# Patient Record
Sex: FEMALE | Race: WHITE | URBAN - METROPOLITAN AREA
[De-identification: names, ages, dates, MRNs, and addresses within clinical notes are randomized per-mention and may not be internally consistent; named-entity substitution may affect disease eponyms.]

---

## 2017-03-06 ENCOUNTER — IMPORTED ENCOUNTER (OUTPATIENT)
Dept: URBAN - METROPOLITAN AREA CLINIC 50 | Facility: CLINIC | Age: 72
End: 2017-03-06

## 2017-07-17 ENCOUNTER — IMPORTED ENCOUNTER (OUTPATIENT)
Dept: URBAN - METROPOLITAN AREA CLINIC 50 | Facility: CLINIC | Age: 72
End: 2017-07-17

## 2017-09-18 ENCOUNTER — IMPORTED ENCOUNTER (OUTPATIENT)
Dept: URBAN - METROPOLITAN AREA CLINIC 50 | Facility: CLINIC | Age: 72
End: 2017-09-18

## 2018-10-08 ENCOUNTER — IMPORTED ENCOUNTER (OUTPATIENT)
Dept: URBAN - METROPOLITAN AREA CLINIC 50 | Facility: CLINIC | Age: 73
End: 2018-10-08

## 2018-11-14 ENCOUNTER — IMPORTED ENCOUNTER (OUTPATIENT)
Dept: URBAN - METROPOLITAN AREA CLINIC 50 | Facility: CLINIC | Age: 73
End: 2018-11-14

## 2019-08-05 ENCOUNTER — IMPORTED ENCOUNTER (OUTPATIENT)
Dept: URBAN - METROPOLITAN AREA CLINIC 50 | Facility: CLINIC | Age: 74
End: 2019-08-05

## 2019-11-19 ENCOUNTER — IMPORTED ENCOUNTER (OUTPATIENT)
Dept: URBAN - METROPOLITAN AREA CLINIC 50 | Facility: CLINIC | Age: 74
End: 2019-11-19

## 2020-11-23 ENCOUNTER — IMPORTED ENCOUNTER (OUTPATIENT)
Dept: URBAN - METROPOLITAN AREA CLINIC 50 | Facility: CLINIC | Age: 75
End: 2020-11-23

## 2021-03-11 ENCOUNTER — IMPORTED ENCOUNTER (OUTPATIENT)
Dept: URBAN - METROPOLITAN AREA CLINIC 50 | Facility: CLINIC | Age: 76
End: 2021-03-11

## 2021-04-17 ASSESSMENT — VISUAL ACUITY
OD_CC: J1+
OS_CC: J1+
OD_SC: 20/40
OD_PH: 20/25
OD_OTHER: 20/30. 20/40.
OS_CC: J1+
OD_PH: 20/30
OS_CC: J1@ 16 IN
OD_SC: 20/40
OD_CC: J1@ 16 IN
OD_CC: 20/20
OD_CC: 20/20-1
OD_BAT: 20/25+
OD_OTHER: 20/60. 20/200.
OD_BAT: 20/30
OD_CC: 20/25
OD_BAT: 20/60
OD_SC: 20/25+
OD_CC: J1+
OD_SC: 20/30-1

## 2021-04-17 ASSESSMENT — TONOMETRY
OS_IOP_MMHG: 19
OD_IOP_MMHG: 14
OS_IOP_MMHG: 19
OD_IOP_MMHG: 14
OD_IOP_MMHG: 14
OD_IOP_MMHG: 16
OD_IOP_MMHG: 13
OS_IOP_MMHG: 20
OS_IOP_MMHG: 17
OD_IOP_MMHG: 14
OS_IOP_MMHG: 19
OS_IOP_MMHG: 17
OS_IOP_MMHG: 17
OD_IOP_MMHG: 12

## 2021-07-09 NOTE — PROCEDURE NOTE: CLINICAL
PROCEDURE NOTE: Epilation Bilateral Lower Lids. Diagnosis: Dry Eye Syndrome. Consent was obtained prior to the procedure. Patient was brought to slit lamp where trichiasis was/were removed using micro forceps. Patient tolerated procedure well. Alena Pollen

## 2021-07-09 NOTE — PATIENT DISCUSSION
Dry eyes causing corneal neovascularization. Patient not currently using any AT's. Will start aggressive lubrication and steroid drop therapy. Advised to start PF AT's 6 times daily. Start Gel AT's Qhs. Start FML BID. Will hold MRX today and repeat once dryness is controlled.

## 2021-07-30 NOTE — PATIENT DISCUSSION
Dry eyes causing corneal neovascularization is better. Recommend Patient continue using any AT's. Continue Gel AT's Qhs. Continue FML BID.

## 2021-10-15 NOTE — PATIENT DISCUSSION
iop in range today,  hvf from 9/13/2021 reviewed with patient and spouse today.  recommend patient continue latanoprost at bedtime in both eyes.

## 2021-11-22 ENCOUNTER — ANNUAL COMPREHENSIVE EXAM (OUTPATIENT)
Dept: URBAN - METROPOLITAN AREA CLINIC 50 | Facility: CLINIC | Age: 76
End: 2021-11-22

## 2021-11-22 ENCOUNTER — PREPPED CHART (OUTPATIENT)
Dept: URBAN - METROPOLITAN AREA CLINIC 50 | Facility: CLINIC | Age: 76
End: 2021-11-22

## 2021-11-22 DIAGNOSIS — H35.371: ICD-10-CM

## 2021-11-22 DIAGNOSIS — H43.811: ICD-10-CM

## 2021-11-22 PROCEDURE — 92014 COMPRE OPH EXAM EST PT 1/>: CPT

## 2021-11-22 PROCEDURE — 92134 CPTRZ OPH DX IMG PST SGM RTA: CPT

## 2021-11-22 ASSESSMENT — KERATOMETRY
OD_K1POWER_DIOPTERS: 46.50
OS_K2POWER_DIOPTERS: 50.00
OS_AXISANGLE2_DEGREES: 52
OS_K1POWER_DIOPTERS: 46.25
OS_AXISANGLE_DEGREES: 142
OD_AXISANGLE2_DEGREES: 160
OD_K2POWER_DIOPTERS: 47.75
OD_AXISANGLE_DEGREES: 070

## 2021-11-22 ASSESSMENT — VISUAL ACUITY
OU_CC: J1+@18IN
OD_CC: 20/25+1
OU_SC: J2@18IN

## 2021-11-22 ASSESSMENT — TONOMETRY
OS_IOP_MMHG: 17
OD_IOP_MMHG: 16

## 2021-12-16 NOTE — PROCEDURE NOTE: CLINICAL
PROCEDURE NOTE: Epilation OS. Diagnosis: Trichiasis without Entropion. Consent was obtained prior to the procedure. Patient was brought to slit lamp where trichiasis was/were removed using micro forceps. Patient tolerated procedure well. Nisa Rios

## 2021-12-16 NOTE — PATIENT DISCUSSION
Recommended aggressive dry eye treatment. Artificial tears 1gtt Q1H until eye is feeling better. cold compresses to help with swelling of lids. Patient to call if symptoms do not improve.

## 2022-04-22 NOTE — PATIENT DISCUSSION
Discussed with patient,  in [place] OCP vs scars from previous Lateral tarsal strip 20 years ago discussed that it is recommended patient start restasis twice as a day as a immune suppressant to help with this.  patient defers referral to [facility] secondary to travel.  will recheck patient in 6 months.  recommend thera tears 4 times a day.

## 2022-10-14 NOTE — PATIENT DISCUSSION
Discussed with patient,  in [place] OCP vs scars from previous Lateral tarsal strip 20 years ago discussed that it is recommended patient start restasis twice as a day as a immune suppressant to help with this.  patient defers referral to [facility] secondary to travel.  will recheck patient in 6 months.  recommend thera tears 4 times a day.

## 2022-10-14 NOTE — PATIENT DISCUSSION
IOP stable 13/12 adjust 13/12. HVF from 9/13/2021 reviewed with patient. Continue latanoprost Qhs OU. RTC in 6 months for IOP check.

## 2023-01-20 NOTE — PATIENT DISCUSSION
Discussed with patient,  in [place] OCP vs scars from previous Lateral tarsal strip 20 years ago discussed that it is recommended patient start restasis twice as a day as a immune suppressant to help with this.  patient defers referral to [facility] secondary to travel.  will recheck patient in 6 months.  recommend thera tears 4 times a day.

## 2023-04-26 ENCOUNTER — COMPREHENSIVE EXAM (OUTPATIENT)
Dept: URBAN - METROPOLITAN AREA CLINIC 50 | Facility: CLINIC | Age: 78
End: 2023-04-26

## 2023-04-26 DIAGNOSIS — H53.002: ICD-10-CM

## 2023-04-26 DIAGNOSIS — H26.491: ICD-10-CM

## 2023-04-26 DIAGNOSIS — H43.811: ICD-10-CM

## 2023-04-26 DIAGNOSIS — H35.371: ICD-10-CM

## 2023-04-26 PROCEDURE — 92014 COMPRE OPH EXAM EST PT 1/>: CPT

## 2023-04-26 PROCEDURE — 92134 CPTRZ OPH DX IMG PST SGM RTA: CPT

## 2023-04-26 ASSESSMENT — TONOMETRY
OD_IOP_MMHG: 12
OS_IOP_MMHG: 12

## 2023-04-26 ASSESSMENT — VISUAL ACUITY
OU_CC: J1+
OD_GLARE: 20/20
OD_CC: 20/20
OU_CC: 20/20
OD_GLARE: 20/20

## 2024-07-15 ENCOUNTER — COMPREHENSIVE EXAM (OUTPATIENT)
Dept: URBAN - METROPOLITAN AREA CLINIC 50 | Facility: LOCATION | Age: 79
End: 2024-07-15

## 2024-07-15 DIAGNOSIS — H35.371: ICD-10-CM

## 2024-07-15 DIAGNOSIS — H10.503: ICD-10-CM

## 2024-07-15 DIAGNOSIS — H18.593: ICD-10-CM

## 2024-07-15 DIAGNOSIS — H52.11: ICD-10-CM

## 2024-07-15 DIAGNOSIS — H33.002: ICD-10-CM

## 2024-07-15 DIAGNOSIS — H52.4: ICD-10-CM

## 2024-07-15 PROCEDURE — 92134 CPTRZ OPH DX IMG PST SGM RTA: CPT

## 2024-07-15 PROCEDURE — 92015 DETERMINE REFRACTIVE STATE: CPT

## 2024-07-15 PROCEDURE — 99214 OFFICE O/P EST MOD 30 MIN: CPT

## 2024-07-15 ASSESSMENT — TONOMETRY
OS_IOP_MMHG: 18
OD_IOP_MMHG: 17

## 2024-07-15 ASSESSMENT — VISUAL ACUITY
OU_SC: J1
OU_CC: J1+/-2
OD_CC: 20/25+2
OS_CC: HM @ 2FT

## 2024-07-23 NOTE — PATIENT DISCUSSION
Recommended observation. Mac OCT at 6 months follow up. well developed, well nourished , in no acute distress , ambulating without difficulty , normal communication ability

## 2025-07-07 ENCOUNTER — COMPREHENSIVE EXAM (OUTPATIENT)
Age: 80
End: 2025-07-07

## 2025-07-07 DIAGNOSIS — H18.593: ICD-10-CM

## 2025-07-07 DIAGNOSIS — H10.503: ICD-10-CM

## 2025-07-07 DIAGNOSIS — H43.811: ICD-10-CM

## 2025-07-07 DIAGNOSIS — H35.371: ICD-10-CM

## 2025-07-07 PROCEDURE — 92134 CPTRZ OPH DX IMG PST SGM RTA: CPT

## 2025-07-07 PROCEDURE — 99214 OFFICE O/P EST MOD 30 MIN: CPT
